# Patient Record
Sex: MALE | Race: BLACK OR AFRICAN AMERICAN | NOT HISPANIC OR LATINO | URBAN - METROPOLITAN AREA
[De-identification: names, ages, dates, MRNs, and addresses within clinical notes are randomized per-mention and may not be internally consistent; named-entity substitution may affect disease eponyms.]

---
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
## 2025-01-14 ENCOUNTER — HOSPITAL ENCOUNTER (INPATIENT)
Age: 20
LOS: 3 days | Discharge: HOME | DRG: 885 | End: 2025-01-17
Attending: STUDENT IN AN ORGANIZED HEALTH CARE EDUCATION/TRAINING PROGRAM | Admitting: PSYCHIATRY & NEUROLOGY
Payer: MEDICAID

## 2025-01-14 DIAGNOSIS — R45.851 SUICIDAL IDEATION: ICD-10-CM

## 2025-01-14 DIAGNOSIS — F32.A DEPRESSION, UNSPECIFIED DEPRESSION TYPE: Primary | ICD-10-CM

## 2025-01-14 LAB
ALBUMIN SERPL-MCNC: 4.6 G/DL (ref 3.5–4.6)
ALP SERPL-CCNC: 69 U/L (ref 35–104)
ALT SERPL-CCNC: 10 U/L (ref 0–41)
AMPHET UR QL SCN: ABNORMAL
ANION GAP SERPL CALCULATED.3IONS-SCNC: 10 MEQ/L (ref 9–15)
APAP SERPL-MCNC: <5 UG/ML (ref 10–30)
AST SERPL-CCNC: 14 U/L (ref 0–40)
BARBITURATES UR QL SCN: ABNORMAL
BASOPHILS # BLD: 0 K/UL (ref 0–0.2)
BASOPHILS NFR BLD: 0.5 %
BENZODIAZ UR QL SCN: ABNORMAL
BILIRUB SERPL-MCNC: 0.7 MG/DL (ref 0.2–0.7)
BILIRUB UR QL STRIP: NEGATIVE
BUN SERPL-MCNC: 12 MG/DL (ref 6–20)
CALCIUM SERPL-MCNC: 9.1 MG/DL (ref 8.5–9.9)
CANNABINOIDS UR QL SCN: POSITIVE
CHLORIDE SERPL-SCNC: 104 MEQ/L (ref 95–107)
CHOLEST SERPL-MCNC: 135 MG/DL (ref 0–199)
CK SERPL-CCNC: 105 U/L (ref 0–190)
CLARITY UR: CLEAR
CO2 SERPL-SCNC: 26 MEQ/L (ref 20–31)
COCAINE UR QL SCN: ABNORMAL
COLOR UR: YELLOW
CREAT SERPL-MCNC: 0.98 MG/DL (ref 0.7–1.2)
DRUG SCREEN COMMENT UR-IMP: ABNORMAL
EOSINOPHIL # BLD: 0.2 K/UL (ref 0–0.7)
EOSINOPHIL NFR BLD: 2.5 %
ERYTHROCYTE [DISTWIDTH] IN BLOOD BY AUTOMATED COUNT: 13.6 % (ref 11.5–14.5)
ESTIMATED AVERAGE GLUCOSE: 105 MG/DL
ETHANOL PERCENT: NORMAL G/DL
ETHANOLAMINE SERPL-MCNC: <10 MG/DL (ref 0–0.08)
FENTANYL SCREEN, URINE: ABNORMAL
GLOBULIN SER CALC-MCNC: 3.1 G/DL (ref 2.3–3.5)
GLUCOSE SERPL-MCNC: 108 MG/DL (ref 70–99)
GLUCOSE UR STRIP-MCNC: NEGATIVE MG/DL
HBA1C MFR BLD: 5.3 % (ref 4–6)
HCT VFR BLD AUTO: 44.8 % (ref 42–52)
HDLC SERPL-MCNC: 42 MG/DL (ref 40–59)
HGB BLD-MCNC: 15.3 G/DL (ref 14–18)
HGB UR QL STRIP: NEGATIVE
KETONES UR STRIP-MCNC: >=80 MG/DL
LDLC SERPL CALC-MCNC: 82 MG/DL (ref 0–129)
LEUKOCYTE ESTERASE UR QL STRIP: NEGATIVE
LYMPHOCYTES # BLD: 2.6 K/UL (ref 1–4.8)
LYMPHOCYTES NFR BLD: 42.5 %
MCH RBC QN AUTO: 28 PG (ref 27–31.3)
MCHC RBC AUTO-ENTMCNC: 34.2 % (ref 33–37)
MCV RBC AUTO: 81.9 FL (ref 79–92.2)
METHADONE UR QL SCN: ABNORMAL
MONOCYTES # BLD: 0.4 K/UL (ref 0.2–0.8)
MONOCYTES NFR BLD: 6.9 %
NEUTROPHILS # BLD: 2.9 K/UL (ref 1.4–6.5)
NEUTS SEG NFR BLD: 47.4 %
NITRITE UR QL STRIP: NEGATIVE
OPIATES UR QL SCN: ABNORMAL
OXYCODONE UR QL SCN: ABNORMAL
PCP UR QL SCN: ABNORMAL
PH UR STRIP: 6 [PH] (ref 5–9)
PLATELET # BLD AUTO: 206 K/UL (ref 130–400)
POTASSIUM SERPL-SCNC: 4.2 MEQ/L (ref 3.4–4.9)
PROPOXYPH UR QL SCN: ABNORMAL
PROT SERPL-MCNC: 7.7 G/DL (ref 6.3–8)
PROT UR STRIP-MCNC: ABNORMAL MG/DL
RBC # BLD AUTO: 5.47 M/UL (ref 4.7–6.1)
SALICYLATES SERPL-MCNC: <0.3 MG/DL (ref 15–30)
SODIUM SERPL-SCNC: 140 MEQ/L (ref 135–144)
SP GR UR STRIP: 1.04 (ref 1–1.03)
TRIGL SERPL-MCNC: 55 MG/DL (ref 0–150)
TSH SERPL-MCNC: 1.01 UIU/ML (ref 0.44–3.86)
URINE REFLEX TO CULTURE: ABNORMAL
UROBILINOGEN UR STRIP-ACNC: 1 E.U./DL
WBC # BLD AUTO: 6.1 K/UL (ref 4.5–11)

## 2025-01-14 PROCEDURE — 83036 HEMOGLOBIN GLYCOSYLATED A1C: CPT

## 2025-01-14 PROCEDURE — 82077 ASSAY SPEC XCP UR&BREATH IA: CPT

## 2025-01-14 PROCEDURE — 85025 COMPLETE CBC W/AUTO DIFF WBC: CPT

## 2025-01-14 PROCEDURE — 80053 COMPREHEN METABOLIC PANEL: CPT

## 2025-01-14 PROCEDURE — 80143 DRUG ASSAY ACETAMINOPHEN: CPT

## 2025-01-14 PROCEDURE — 99285 EMERGENCY DEPT VISIT HI MDM: CPT

## 2025-01-14 PROCEDURE — 82550 ASSAY OF CK (CPK): CPT

## 2025-01-14 PROCEDURE — 1240000000 HC EMOTIONAL WELLNESS R&B

## 2025-01-14 PROCEDURE — 6370000000 HC RX 637 (ALT 250 FOR IP): Performed by: PSYCHIATRY & NEUROLOGY

## 2025-01-14 PROCEDURE — 36415 COLL VENOUS BLD VENIPUNCTURE: CPT

## 2025-01-14 PROCEDURE — 80061 LIPID PANEL: CPT

## 2025-01-14 PROCEDURE — 81003 URINALYSIS AUTO W/O SCOPE: CPT

## 2025-01-14 PROCEDURE — 80307 DRUG TEST PRSMV CHEM ANLYZR: CPT

## 2025-01-14 PROCEDURE — 84443 ASSAY THYROID STIM HORMONE: CPT

## 2025-01-14 PROCEDURE — 80179 DRUG ASSAY SALICYLATE: CPT

## 2025-01-14 RX ORDER — ACETAMINOPHEN 325 MG/1
650 TABLET ORAL EVERY 4 HOURS PRN
Status: DISCONTINUED | OUTPATIENT
Start: 2025-01-14 | End: 2025-01-17 | Stop reason: HOSPADM

## 2025-01-14 RX ORDER — HYDROXYZINE HYDROCHLORIDE 50 MG/ML
50 INJECTION, SOLUTION INTRAMUSCULAR EVERY 6 HOURS PRN
Status: DISCONTINUED | OUTPATIENT
Start: 2025-01-14 | End: 2025-01-17 | Stop reason: HOSPADM

## 2025-01-14 RX ORDER — TRAZODONE HYDROCHLORIDE 50 MG/1
50 TABLET, FILM COATED ORAL NIGHTLY PRN
Status: DISCONTINUED | OUTPATIENT
Start: 2025-01-14 | End: 2025-01-17 | Stop reason: HOSPADM

## 2025-01-14 RX ORDER — HALOPERIDOL 5 MG/ML
5 INJECTION INTRAMUSCULAR EVERY 6 HOURS PRN
Status: DISCONTINUED | OUTPATIENT
Start: 2025-01-14 | End: 2025-01-17 | Stop reason: HOSPADM

## 2025-01-14 RX ORDER — HALOPERIDOL 5 MG/1
5 TABLET ORAL EVERY 6 HOURS PRN
Status: DISCONTINUED | OUTPATIENT
Start: 2025-01-14 | End: 2025-01-17 | Stop reason: HOSPADM

## 2025-01-14 RX ORDER — BENZTROPINE MESYLATE 1 MG/ML
2 INJECTION, SOLUTION INTRAMUSCULAR; INTRAVENOUS 2 TIMES DAILY PRN
Status: DISCONTINUED | OUTPATIENT
Start: 2025-01-14 | End: 2025-01-17 | Stop reason: HOSPADM

## 2025-01-14 RX ORDER — HYDROXYZINE PAMOATE 50 MG/1
50 CAPSULE ORAL EVERY 6 HOURS PRN
Status: DISCONTINUED | OUTPATIENT
Start: 2025-01-14 | End: 2025-01-17 | Stop reason: HOSPADM

## 2025-01-14 RX ORDER — MAGNESIUM HYDROXIDE/ALUMINUM HYDROXICE/SIMETHICONE 120; 1200; 1200 MG/30ML; MG/30ML; MG/30ML
30 SUSPENSION ORAL PRN
Status: DISCONTINUED | OUTPATIENT
Start: 2025-01-14 | End: 2025-01-17 | Stop reason: HOSPADM

## 2025-01-14 RX ADMIN — TRAZODONE HYDROCHLORIDE 50 MG: 50 TABLET, FILM COATED ORAL at 21:06

## 2025-01-14 RX ADMIN — HYDROXYZINE PAMOATE 50 MG: 50 CAPSULE ORAL at 21:06

## 2025-01-14 ASSESSMENT — PATIENT HEALTH QUESTIONNAIRE - PHQ9
SUM OF ALL RESPONSES TO PHQ QUESTIONS 1-9: 1
SUM OF ALL RESPONSES TO PHQ9 QUESTIONS 1 & 2: 1
SUM OF ALL RESPONSES TO PHQ QUESTIONS 1-9: 1
1. LITTLE INTEREST OR PLEASURE IN DOING THINGS: NOT AT ALL
2. FEELING DOWN, DEPRESSED OR HOPELESS: SEVERAL DAYS
SUM OF ALL RESPONSES TO PHQ QUESTIONS 1-9: 1
SUM OF ALL RESPONSES TO PHQ QUESTIONS 1-9: 1

## 2025-01-14 ASSESSMENT — SLEEP AND FATIGUE QUESTIONNAIRES
DO YOU HAVE DIFFICULTY SLEEPING: NO
SLEEP PATTERN: NORMAL
DO YOU USE A SLEEP AID: YES
AVERAGE NUMBER OF SLEEP HOURS: 7

## 2025-01-14 ASSESSMENT — LIFESTYLE VARIABLES
HOW OFTEN DO YOU HAVE A DRINK CONTAINING ALCOHOL: 2-4 TIMES A MONTH
HOW MANY STANDARD DRINKS CONTAINING ALCOHOL DO YOU HAVE ON A TYPICAL DAY: 3 OR 4

## 2025-01-14 ASSESSMENT — ENCOUNTER SYMPTOMS
NAUSEA: 0
CHEST TIGHTNESS: 0
ABDOMINAL DISTENTION: 0
EYE REDNESS: 0
COLOR CHANGE: 0
SINUS PAIN: 0
COUGH: 0
DIARRHEA: 0
SHORTNESS OF BREATH: 0
EYE PAIN: 0
RHINORRHEA: 0
EYE DISCHARGE: 0
ABDOMINAL PAIN: 0
EYE ITCHING: 0
VOMITING: 0

## 2025-01-14 ASSESSMENT — PAIN - FUNCTIONAL ASSESSMENT: PAIN_FUNCTIONAL_ASSESSMENT: NONE - DENIES PAIN

## 2025-01-14 NOTE — ED NOTES
Patient to [facility], changed into psych safe clothing. Skin intact. Belongings secured and placed into locker 4. No contraband found at this time. Urine specimen provided and sent to lab. Lab notified of new orders.

## 2025-01-14 NOTE — ED NOTES
Provisional Diagnosis:   Depression    Psychosocial and Contextual Factors:     Born in [place]. Raised in [place] and [place]. Moved to [place] about 2 years ago  Dropped out of school in the 12th grade  Lives with girlfriend of 2 years and 9 month old daughter   No insurance     C-SSRS Summary:  C-SSRS Suicide Screening1) Within the past month, have you wished you were dead or wished you could go to sleep and not wake up? : Yes2) Have you actually had any thoughts of killing yourself? : Yes3) Have you been thinking about how you might kill yourself? : Yes4) Have you had these thoughts and had some intention of acting on them? : No5) Have you started to work out or worked out the details of how to kill yourself? Do you intend to carry out this plan? : No6) Have you ever done anything, started to do anything, or prepared to do anything to end your life?: YesDid this occur within the past 3 months? : YesRisk of Suicide: High Risk  C-SSRS Risk AssessmentSuicidal and Self-Injurious Behavior : Other preparatory acts to kill self - Past 3 monthsSuicidal Ideation (Most Severe in Past Month): Wish to be dead; Suicidal thoughts; Suicidal thoughts with method (but without specific plan or intent to act)Activating Events (Recent): Other (comment)Treatment History: Not receiving treatmentClinical Status (Recent): Hopelessness; Major depression episode    Patient: guarded, poor eye contact, quiet, pleasant   Family: none  Agency: none    Substance Abuse: Reports daily use of marijuana     Present Suicidal Behavior:      Verbal: Has suicidal thoughts worsening, just wants to \"end it all\" Has thought about shooting self with gun and does have access to gun     Attempt: Denies. Had unloaded gun in hand and had thoughts of loading gun but did not     Past Suicidal Behavior:     Verbal:reports suicidal thoughts on and off for several months but reports worsening over the past few weeks     Attempt:Denies

## 2025-01-14 NOTE — ED PROVIDER NOTES
[facility] EMERGENCY DEPARTMENT  EMERGENCY DEPARTMENT ENCOUNTER      Pt Name: Ruben Watson  MRN: [id number]  Birthdate 2005  Date of evaluation: 1/14/2025  Provider: Jacob Warren MD  3:07 PM    CHIEF COMPLAINT       Chief Complaint   Patient presents with    Suicidal     SUICIDAL          HISTORY OF PRESENT ILLNESS    Ruben Watson is a 19 y.o. male who presents to the emergency department for suicidal ideations     HPI  Patient is a 19-year-old male presenting to the ED due to suicidal ideations.  Patient has no psychiatric history.  Patient endorsed to me that for 3 months has been having on and off suicidal thoughts.  During this time.  He is also had increased depression.  He endorses decreased sleep, appetite, interest and concentration and activities.  He endorses having some guilt as well.  He was vague on the guilt although described that he feels guilty for \"his actions and in-actions in relation to his girlfriend\".  He denies any physical altercations with his girlfriend.  He lives with his girlfriend and his child.  He endorsed that 2-3 days ago he almost attempted suicide.  He endorsed that he had one of his pistols in his hand which had no ammunition in it.  He was holding it in his hand but never pointed it at himself.  He endorsed that he was having thoughts of putting ammunition inside the pistol and shooting himself.  He denies any other plans for suicide including not taking any medications in excess.  He denies any homicidal ideations or plans.  He denies any previous suicide attempts or inpatient psychiatric admissions.  He denies being on any psychiatric medications.  He went to the [facility] today after his sister, who goes to the [facility], told him about it to get some help.  He denies any visual or auditory or tactile hallucinations.  He endorses social alcohol consumption, on and off marijuana usage, he does not smoke cigarettes.  He denies any cocaine or methamphetamine or

## 2025-01-14 NOTE — PROGRESS NOTES
1501 Received report from FESTUS Muñoz.  1510 Pt admitted to unit via wheelchair. Pt ambulated with strong steady gait to room. Dual skin check and contraband check done with FESTUS Shahid. Both negative.

## 2025-01-14 NOTE — CONSULTS
Consult Note            Date:1/14/2025        Patient Name:Ruben Watson     YOB: 2005     Age:19 y.o.    Reason for Consult: Evaluation recommendation for chronic disease management    Chief Complaint     Chief Complaint   Patient presents with    Suicidal     SUICIDAL           History Obtained From   patient, electronic medical record    History of Present Illness   Patient is a 19-year-old male admitted for mental health evaluation for thoughts of suicide ideation.  Patient reports inability to sleep denies suicide ideation at this time.  Calm and cooperative with assessment.  Patient denies chest pain, palpitations, lightheadedness, headache, dizziness, shortness of breath, cough, N/V/D, and changes in appetite.  Patient also denies smoking, illicit drug, and alcohol use.  Denies self-inflicted injuries or wounds.   Hospitalist consulted for evaluation and recommendations for acute and chronic disease management while receiving inpatient psych services.        Past Medical History   History reviewed. No pertinent past medical history.     Past Surgical History   No past surgical history on file.     Medications     Prior to Admission medications    Not on File        hydrOXYzine pamoate (VISTARIL) capsule 50 mg, Q6H PRN   Or  hydrOXYzine (VISTARIL) injection 50 mg, Q6H PRN  acetaminophen (TYLENOL) tablet 650 mg, Q4H PRN  magnesium hydroxide (MILK OF MAGNESIA) 400 MG/5ML suspension 30 mL, Daily PRN  aluminum & magnesium hydroxide-simethicone (MAALOX) 200-200-20 MG/5ML suspension 30 mL, PRN  haloperidol (HALDOL) tablet 5 mg, Q6H PRN   Or  haloperidol lactate (HALDOL) injection 5 mg, Q6H PRN  benztropine mesylate (COGENTIN) injection 2 mg, BID PRN  traZODone (DESYREL) tablet 50 mg, Nightly PRN        Allergies   Patient has no known allergies.    Social History     Social History       Tobacco History       Smoking Status  Never Assessed      Smokeless Tobacco Use  Unknown              Alcohol

## 2025-01-15 PROCEDURE — 6370000000 HC RX 637 (ALT 250 FOR IP): Performed by: PSYCHIATRY & NEUROLOGY

## 2025-01-15 PROCEDURE — 99223 1ST HOSP IP/OBS HIGH 75: CPT | Performed by: PSYCHIATRY & NEUROLOGY

## 2025-01-15 PROCEDURE — 1240000000 HC EMOTIONAL WELLNESS R&B

## 2025-01-15 RX ORDER — SERTRALINE HYDROCHLORIDE 25 MG/1
25 TABLET, FILM COATED ORAL DAILY
Status: DISCONTINUED | OUTPATIENT
Start: 2025-01-15 | End: 2025-01-16

## 2025-01-15 RX ADMIN — TRAZODONE HYDROCHLORIDE 50 MG: 50 TABLET, FILM COATED ORAL at 21:15

## 2025-01-15 RX ADMIN — SERTRALINE HYDROCHLORIDE 25 MG: 25 TABLET ORAL at 14:00

## 2025-01-15 ASSESSMENT — PATIENT HEALTH QUESTIONNAIRE - PHQ9
2. FEELING DOWN, DEPRESSED OR HOPELESS: NOT AT ALL
SUM OF ALL RESPONSES TO PHQ QUESTIONS 1-9: 0
1. LITTLE INTEREST OR PLEASURE IN DOING THINGS: NOT AT ALL
SUM OF ALL RESPONSES TO PHQ9 QUESTIONS 1 & 2: 0
SUM OF ALL RESPONSES TO PHQ QUESTIONS 1-9: 0

## 2025-01-15 ASSESSMENT — LIFESTYLE VARIABLES
HOW MANY STANDARD DRINKS CONTAINING ALCOHOL DO YOU HAVE ON A TYPICAL DAY: PATIENT DOES NOT DRINK
HOW OFTEN DO YOU HAVE A DRINK CONTAINING ALCOHOL: NEVER

## 2025-01-15 NOTE — GROUP NOTE
Date: 1/15/2025    Group Start Time: 1020  Group End Time: 1055  Group Topic: Psychoeducation    [facility] [place] [facility] Odalys Orta    Stages of Change through a Musical Lens  Clarifying, Education, Exploration, Recorded Music Listening, Song Categorization    Patients will learn about the 5 Stages of Change (pre-contemplation, contemplation, preparation, action, and maintenance), and explore what stage of change they feel like they are in currently. Patients will be asked to listen to a variety of songs and \"diagnose\" them into a specific stage of change. Patients and LPMT will discuss why songs might fit into one/multiple stage(s).    Focus: Breaking Negative Cycles, Motivation, Recovery    Goals: Increase Future-Oriented Thinking, Improve Coping Skills, Improve Insight/Self-Awareness, Improve Self-Expression, Improve Attention to Task     Patient listened to/engaged in peer discussions about the stages of change. Patient provided relevant comments as to what stage he felt was represented in the music. Patient verbalized support for peers' contributions.     Attended: 3/4-full attendance  Participation Level: Active Listener  Participation Quality: Sharing  Affect/Mood: Congruent/Euthymic  Speech: normal rate and normal volume   Thought Content/Processes: Linear  Level of consciousness: Alert  Response: Able to verbalize current knowledge/experience  Outcomes: Actively participated in the group experience    Signature: Odalys Purdy, Licensed Professional Music Therapist (LPMT)

## 2025-01-15 NOTE — CARE COORDINATION
Psychosocial Assessment     Admission Reason: Client has been admitted to [facility] after being pink slipped by Gita for SI with a plan. Client's life stressors are financial     C-SSRS Lifetime Recent Completed - Current Suicide Risk:   [] No Risk  [] Low [x] Moderate [] High     Risk Factors:   Unemployed,     Protective Factors: Supportive family, no lethal means, reports is not suicidal currently       Gender:  [x] Male [] Female [] Transgender  [] Other    Sexual Orientation:  [x] Heterosexual [] Homosexual [] Bisexual [] Other    Current or Past Mental Health and/or Addictions Treatment (and response to treatment):  [] Yes, When and Where:   [x] No    Substance Use/Alcohol Use/Addiction (document name of substance, age of onset, how much and how often, route of use and date of last use):  [x] Reports   Substance: Marijuana   Age of Onset:  \"Teenage\"   How Much and how often: \"A joint every now and then.\"   Last Usage: week of admission     [] Denies    AUDIT: 0   DAST: 1  PHQ 9: 0    Education provided:  [x] Yes  [] No  [] N/A [] Refused    Learners: Patient [x]  Family []  Significant Other  [] Caregiver [] Other []   Readiness: Eager []   Acceptance  []   Nonacceptances [x]   Refused []   Method: Explanation [x]   Handout []   Response: Verbalizes Understanding []   No evidence of Learning []   Refuses [x]     Referral made to Let's get Real  [] Yes       Date:              [x] No    Family History of Mental Illness or Substance Use/Abuse:   [] Yes (Specify)    [x] No    Trauma and Abuse History:   [] Reports   ( Physical []  Verbal []  Emotional []  Financial []   Sexual [] )  [x] Denies      Legal History:  []  Yes (Specify)    [x] No     Involvement:  [] Yes (Specify)    [x] No     Employment and/or Benefits:    [] Yes (Specify)   SSD []  SSI []   SNAP[] Medicaid[]  [x] No      Leisure & Recreational Interests and Hobbies/Coping Skills:  Reports walks in nature, being with family and music

## 2025-01-15 NOTE — GROUP NOTE
Group Therapy Note    Date: 1/14/2025    Group Start Time: 2050  Group End Time: 2145  Group Topic: Wrap-Up    [facility] Jessica Esquivel        Group Therapy Note    Attendees: 7/19       Notes:  Pt did not attend.     Discipline Responsible: Behavorial Health Tech      Signature:  Jessica Salazar

## 2025-01-15 NOTE — GROUP NOTE
Date: 1/15/2025    Group Start Time: 0925  Group End Time: 0958  Group Topic: Music Therapy    [facility] Odalys Orta    Change Identification  Exploration, [place] Analysis/Discussion, Receptive Music Listening    Patients will listen to \"It's Not the Same Anymore\" by Sreekanth Heck and discuss lyrics, themes, and interpretations derived from the song. Patients will explore what change means to them and identify what change(s) they would like to make in their lives.     Focus: Change, Processing, Recovery    Goals: Increase Future-Oriented Thinking, Improve Coping Skills, Improve Insight/Self-Awareness, Improve Self-Expression, Improve Attention to Task     Patient joined towards the end of group, but was an active participant while present. Patient shared that his daughter brings flor to his life and inspires him to change. Patient expressed that he wants to learn how to reach out for help / open up more.     Attended: 1/4-1/2 attendance  Participation Level: Active Listener and Interactive  Participation Quality: Attentive and Sharing  Affect/Mood: Congruent/Euthymic  Speech: spontaneous, normal rate, and normal volume   Thought Content/Processes: Linear  Level of consciousness: Alert  Response: Able to verbalize current knowledge/experience  Outcomes: Actively participated in the group experience and Initial interaction with patient    Signature: Odalys Purdy, Licensed Professional Music Therapist (LPMT)

## 2025-01-15 NOTE — CARE COORDINATION
Leisure Assessment  January 15, 2025 /  1000 am    Appearance: Alert, Appears as stated age, and Pleasant  Current Mental Status: Calm and Cooperative  Affect/Mood: Incongruent/ Euthymic  Thought Content/Processes: Vague  Insight/Judgement: Poor insight and Poor judgment  Speech: spontaneous, normal rate, and normal volume  Delusions/Hallucinations: none observed/reported    Admit Status:  Involuntary     Patient identified his leisure interests were spending time with his daughter and writing music. Patient reported that he prefers to spend his time with others, and that his girlfriend is very supportive. Patient expressed that he has been opening up more over the past few months and has found it beneficial. Patient described the circumstances leading to admission were \"I had depression so I said the right things I needed to get admitted.\" Patient would like to focus on connecting with OP counseling services and medication management. Patient identified his strength was being \"funny.\"    Recommendations: Decrease Impulsivity/Impulsive Behaviors, Improve/Maintain Coping Skills Development, Improve/Maintain Emotion Regulation Skills/Mood, Improve/Maintain Expressive Communication/Self-Expression, and Improve/Maintain Insight/Self-Awareness    Signature: Odalys Purdy, Licensed Professional Music Therapist (LPMT)

## 2025-01-15 NOTE — PLAN OF CARE
Patient is out in the day room playing cards. Flat affect. Pt reports feeling better after talking with staff today. Minimizes admission. Denies both anxiety and depression. Denies SI/HI and AVH. Reports good sleep and appetite. Denies further needs at this time   Problem: Self Harm/Suicidality  Goal: Will have no self-injury during hospital stay  Description: INTERVENTIONS:  1.  Ensure constant observer at bedside with Q15M safety checks  2.  Maintain a safe environment  3.  Secure patient belongings  4.  Ensure family/visitors adhere to safety recommendations  5.  Ensure safety tray has been added to patient's diet order  6.  Every shift and PRN: Re-assess suicidal risk via Frequent Screener    1/14/2025 2014 by Sharri Justice RN  Outcome: Progressing  1/14/2025 1611 by Fátima Banegas RN  Outcome: Progressing     Problem: Anxiety  Goal: Will report anxiety at manageable levels  Description: INTERVENTIONS:  1. Administer medication as ordered  2. Teach and rehearse alternative coping skills  3. Provide emotional support with 1:1 interaction with staff  1/14/2025 2014 by Sharri Justice RN  Outcome: Progressing  1/14/2025 1611 by Fátima Banegas RN  Outcome: Progressing     Problem: Coping  Goal: Pt/Family able to verbalize concerns and demonstrate effective coping strategies  Description: INTERVENTIONS:  1. Assist patient/family to identify coping skills, available support systems and cultural and spiritual values  2. Provide emotional support, including active listening and acknowledgement of concerns of patient and caregivers  3. Reduce environmental stimuli, as able  4. Instruct patient/family in relaxation techniques, as appropriate  5. Assess for spiritual pain/suffering and initiate Spiritual Care, Psychosocial Clinical Specialist consults as needed  1/14/2025 2014 by Sharri Justice RN  Outcome: Progressing  1/14/2025 1611 by Fátima Banegas RN  Outcome: Progressing     Problem: Depression/Self Harm  Goal:

## 2025-01-15 NOTE — PLAN OF CARE
Pt is calm, cooperative, and pleasant.  Denies all.  Responds politely with answering questions saying yes phi and no phi.  No depression \"since I've been talking to people in here.  I'm not alone.\" Pt has good eye contact and responds appropriately.  1/10 anxiety.  Reports good sleep and appetite.         Problem: Self Harm/Suicidality  Goal: Will have no self-injury during hospital stay  Description: INTERVENTIONS:  1.  Ensure constant observer at bedside with Q15M safety checks  2.  Maintain a safe environment  3.  Secure patient belongings  4.  Ensure family/visitors adhere to safety recommendations  5.  Ensure safety tray has been added to patient's diet order  6.  Every shift and PRN: Re-assess suicidal risk via Frequent Screener    1/15/2025 0808 by Kayleigh Stoll, RN  Outcome: Progressing  Flowsheets (Taken 1/15/2025 0802)  Will have no self-injury during hospital stay: Maintain a safe environment  1/14/2025 2014 by Sharri Justice RN  Outcome: Progressing     Problem: Anxiety  Goal: Will report anxiety at manageable levels  Description: INTERVENTIONS:  1. Administer medication as ordered  2. Teach and rehearse alternative coping skills  3. Provide emotional support with 1:1 interaction with staff  1/15/2025 0808 by Kayleigh Stoll RN  Outcome: Progressing  Flowsheets (Taken 1/15/2025 0802)  Will report anxiety at manageable levels:   Administer medication as ordered   Provide emotional support with 1:1 interaction with staff   Teach and rehearse alternative coping skills  1/14/2025 2014 by Sharri Justice RN  Outcome: Progressing     Problem: Coping  Goal: Pt/Family able to verbalize concerns and demonstrate effective coping strategies  Description: INTERVENTIONS:  1. Assist patient/family to identify coping skills, available support systems and cultural and spiritual values  2. Provide emotional support, including active listening and acknowledgement of concerns of patient and caregivers  3.  by Sharri Justice, RN  Outcome: Progressing  Flowsheets (Taken 1/14/2025 2014)  Effect of psychiatric condition will be minimized and patient will be protected from self harm: Provide emotional support, presence and reassurance     Problem: Involuntary Admit  Goal: Will cooperate with staff recommendations and doctor's orders and will demonstrate appropriate behavior  Description: INTERVENTIONS:  1. Treat underlying conditions and offer medication as ordered  2. Educate regarding involuntary admission procedures and rules  3. Contain excessive/inappropriate behavior per unit and hospital policies  1/15/2025 0808 by Kayleigh Stoll, RN  Outcome: Progressing  Flowsheets (Taken 1/15/2025 0802)  Will cooperate with staff recommendations and doctor's orders and will demonstrate appropriate behavior: Educate regarding involuntary admission procedures and rules  1/14/2025 2014 by Sharri Justice, RN  Outcome: Progressing     Problem: Discharge Planning  Goal: Discharge to home or other facility with appropriate resources  1/15/2025 0808 by Kayleigh Stoll, RN  Outcome: Progressing  Flowsheets (Taken 1/15/2025 0802)  Discharge to home or other facility with appropriate resources:   Identify barriers to discharge with patient and caregiver   Arrange for needed discharge resources and transportation as appropriate  1/14/2025 2014 by Sharri Justice, RN  Outcome: Progressing

## 2025-01-15 NOTE — H&P
[facility] - Department of Psychiatry    History and Physical - Adult         CHIEF COMPLAINT:  Depression SI    History obtained from:  patient    Patient was seen after discussing with the treatment team and reviewing the chart        CIRCUMSTANCES OF ADMISSION:     Patient presented to the ED at the request of Gita for suicidal ideation. He voluntarily came. Patient is guarded, flat affect with poor eye contact. He reports being depressed for a while now but suicidal thoughts are worsening. He wants to just \"end it all\" and has thoughts of using a gun to shoot self. A few days ago he had an unloaded gun in his hand but never pointed it at himself, he was thinking about loading it and shooting himself but did not load it. He has no family support around. Both his Dad and step-mom passed away when he was 15 years old. He found his Dad  on the floor. Has never sought treatment or therapy from this. He has a sister but she lives in [place]. Recently he has been overwhelmed and stressed out financially. He is not working and has no source of income. States he was let go from his job about a month and a half ago and currently seeking employment. Denies any HI. Denies any hallucinations.      HISTORY OF PRESENT ILLNESS:      The patient is a 19 y.o. male live with GF and daughter 9 month old unemployed with no significant past history of mental illness    Pt has been feeling depressed for the past few weeks, getting worse  Stressors: financial stressor, getting along with the relationship  Pt is currently minimizing the circumstances of admission  Pt want someone to talk to and he lied about it to come over here  I don't have a gun or ammunition  Severity: Rating mood to be around 3-4/10 (10- good)  Quality:melancholic  Content: Hopeless, worthless and helpless feeling  Suicidal thoughts - minimizing  Associated symptoms:  Poor concentration, anhedonia, decrease motivation  Sleep and  Information:  Will obtain collateral information from the family or friends.  Will obtain medical records as appropriate from out patient providers  Will consult the hospitalist for a physical exam to rule out any co-morbid physical condition.    Home medication Reconciled       New Medications started during this admission :    See orders  Prn Haldol 5mg and Vistaril 50mg q6hr for extreme agitation.  Trazodone as ordered for insomnia  Vistaril as ordered for anxiety  Discussed with the patient risk, benefit, alternative and common side effects for the  proposed medication treatment. Patient is consenting to the treatment.    Psychotherapy:   Encourage participation in milieu and group therapy  Individual therapy as needed      Electronically signed by GABBIE ARAMBULA MD on 1/15/2025 at 9:41 AM

## 2025-01-15 NOTE — PROGRESS NOTES
Behavioral Services  Medicare Certification Upon Admission    I certify that this patient's inpatient psychiatric hospital admission is medically necessary for:    [x] (1) Treatment which could reasonably be expected to improve this patient's condition,       [x] (2) Or for diagnostic study;     AND     [x](2) The inpatient psychiatric services are provided while the individual is under the care of a physician and are included in the individualized plan of care.    Estimated length of stay/service 3-5    Plan for post-hospital care OP care    Electronically signed by GABBIE ARAMBULA MD on 1/15/2025 at 9:40 AM

## 2025-01-16 PROCEDURE — 99232 SBSQ HOSP IP/OBS MODERATE 35: CPT | Performed by: PSYCHIATRY & NEUROLOGY

## 2025-01-16 PROCEDURE — 90833 PSYTX W PT W E/M 30 MIN: CPT | Performed by: PSYCHIATRY & NEUROLOGY

## 2025-01-16 PROCEDURE — 6370000000 HC RX 637 (ALT 250 FOR IP): Performed by: PSYCHIATRY & NEUROLOGY

## 2025-01-16 PROCEDURE — 1240000000 HC EMOTIONAL WELLNESS R&B

## 2025-01-16 RX ADMIN — SERTRALINE HYDROCHLORIDE 25 MG: 25 TABLET ORAL at 08:50

## 2025-01-16 RX ADMIN — TRAZODONE HYDROCHLORIDE 50 MG: 50 TABLET, FILM COATED ORAL at 21:55

## 2025-01-16 NOTE — GROUP NOTE
Group Therapy Note    Date: 1/16/2025    Group Start Time: 0900  Group End Time: 1000  Group Topic: Psychoeducation    [facility] Phyllis Simon    Group Therapy Note    Note Card Discussion    Patients will be told to choose one or more of the note cards provided to them, and will read the question out loud to the other peers/staff. Patient will then answer the question, and all other peers will answer the same question. Questions range from using coping skills, mood management, and personal preferences. Patients will be encouraged to discuss their answers with other peers/staff.     Goals: Reality orientation, positive coping skills, mood management skills, peer discussion, fair/good insight skills.             Pt was pleasant and sociable during group. Pt answered all questions and had a brightened facial affect when answering. Pt mentioned unsafe coping mechanisms AEB reporting that he \"smokes to cope with life\".     Attended: Full attendance  Participation Level: Interactive  Participation Quality: Appropriate and Attentive  Affect/Mood: Bright/Brightens and Calm  Speech: normal rate and whispered   Insight/Judgement: Fair insight and Fair judgment  Response: Able to verbalize/acknowledge new learning and Able to retain information    Signature: Phyllis Pineda, Psychoeducational Specialist

## 2025-01-16 NOTE — CARE COORDINATION
FAMILY COLLATERAL NOTE    Family/Support Name:  Ivett   Contact #: [phone]   Relationship to Pt::  Girlfriend        Family/Support contact aware of hospitalization: Yes   Presenting Symptoms/Current Concerns:  Increased life stress due to limited finances, unresolved childhood trauma and grief     Top 3 Life Stressors:   Lack of familial support (family ) remainder of family is out of state.   Finical strain.       Background History Relevant to Current Hospitalization:  Reports increased stressors, depressed mood, and not being himself with more reliance on marijuana         Family Mental Health/Substance Use History:   Unknown       Support Network's Goal for Hospitalization:  Yes     Discharge Plan: D/C home and F/U [facility] as a new client       Support Network Supportive of Discharge Plan:  Yes       Support can confirm Safety of Location and Security of Weapons:  No weapons in home       Support agreeable to Safeguard and Monitor Medications (including Prescription and OTC):  If Needed     Identified Barriers to Compliance with Discharge Plan:  Transportation will be an issue from 7 am to 5 pm     Recommendations for Support Network:         ELLIOTT Sanchez

## 2025-01-16 NOTE — PLAN OF CARE
Patient denies SI/HI/AVH.  Sleep is good.  Rates anxiety and depression as 0.  Reports coming to [facility] was a good move on his part because he needed help.  Glad to have met up with others who have the same sorts of problems as he does.  \"It feels really good to know others are feeling the same way I do.\"  \"I walked around and did laps and was able to talk.\"              Problem: Self Harm/Suicidality  Goal: Will have no self-injury during hospital stay  Description: INTERVENTIONS:  1.  Ensure constant observer at bedside with Q15M safety checks  2.  Maintain a safe environment  3.  Secure patient belongings  4.  Ensure family/visitors adhere to safety recommendations  5.  Ensure safety tray has been added to patient's diet order  6.  Every shift and PRN: Re-assess suicidal risk via Frequent Screener    1/16/2025 0748 by Laila Polanco RN  Outcome: Progressing  1/15/2025 2106 by Sharri Justice RN  Outcome: Progressing     Problem: Anxiety  Goal: Will report anxiety at manageable levels  Description: INTERVENTIONS:  1. Administer medication as ordered  2. Teach and rehearse alternative coping skills  3. Provide emotional support with 1:1 interaction with staff  1/16/2025 0748 by Laila Polanco RN  Outcome: Progressing  1/15/2025 2106 by Sharri Justice RN  Outcome: Progressing     Problem: Coping  Goal: Pt/Family able to verbalize concerns and demonstrate effective coping strategies  Description: INTERVENTIONS:  1. Assist patient/family to identify coping skills, available support systems and cultural and spiritual values  2. Provide emotional support, including active listening and acknowledgement of concerns of patient and caregivers  3. Reduce environmental stimuli, as able  4. Instruct patient/family in relaxation techniques, as appropriate  5. Assess for spiritual pain/suffering and initiate Spiritual Care, Psychosocial Clinical Specialist consults as needed  1/16/2025 0748 by Laila Polanco RN  Outcome:

## 2025-01-16 NOTE — GROUP NOTE
Group Therapy Note    Date: 1/16/2025    Group Start Time: 1720  Group End Time: 1810  Group Topic: Recreational    [facility] Jessica Esquivel    Patients engaged in a karaoke session for today's recreational group.   Group Therapy Note    Attendees: 13/19     Notes:  Pt attended today's group. Pt sang a song of his own that he has published on [employer] and [email or web address]. Pt expressed his enjoyment for today's group to this writter.     Discipline Responsible: Behavorial Health Tech      Signature:  Jessica Salazar

## 2025-01-16 NOTE — GROUP NOTE
Date: 1/16/2025    Group Start Time: 1025  Group End Time: 1105  Group Topic: Music Therapy    [facility] Odalys Orta    Montserrat Collage  Receptive Music Listening, Montserrat Analysis/Discussion, and Art Reflection    Patients will choose from a variety of preprepared song lyrics to use in a collage representing who they are. Patients will be encouraged to add color and additional words to their collage if they so choose. Patients will have the opportunity to share their collage with the group and reflect on the experience as a whole. Patients will listen to an LPMT-curated playlist comprised of the songs where the preprepared lyrics originate.    Focus: Creativity, Community Building, and Processing    Goals: Improve/Maintain Attention to Task, Improve/Maintain Identity Development, Improve/Maintain Insight / Self-Awareness, Increase/Maintain Level of Socialization, Improve Mood, Improve/Maintain Self-Esteem, Improve/Maintain Self-Expression, Improve/Maintain Use of Coping Skills, and Increase Sensory Stimulation     Patient appropriately utilized art materials to create his montserrat collage. Patient expressed that his collage represented how \"I was lost, but now I am finding my strength.\" Patient was cheerful and sociable with peers/staff.     Attended: 1/4-1/2 attendance  Participation Level: Active Listener and Interactive  Participation Quality: Attentive, Sharing, and Supportive  Affect/Mood: Congruent/Euthymic  Speech: spontaneous, normal rate, and normal volume   Thought Content/Processes: Linear  Level of consciousness: Alert  Response: Able to verbalize current knowledge/experience  Outcomes: Successfully internalized the purpose of intervention and Actively participated in the group experience    Signature: Odalys Purdy, Licensed Professional Music Therapist (LPMT)

## 2025-01-16 NOTE — PROGRESS NOTES
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 No pertinent past medical history.    FAMILY/SOCIAL HISTORY:  No family history on file.  Social History     Socioeconomic History    Marital status: Single     Spouse name: Not on file    Number of children: Not on file    Years of education: Not on file    Highest education level: Not on file   Occupational History    Not on file   Tobacco Use    Smoking status: Not on file    Smokeless tobacco: Not on file   Substance and Sexual Activity    Alcohol use: Not on file    Drug use: Not on file    Sexual activity: Not on file   Other Topics Concern    Not on file   Social History Narrative    Not on file     Social Determinants of Health     Financial Resource Strain: Not on file   Food Insecurity: No Food Insecurity (1/15/2025)    Hunger Vital Sign     Worried About Running Out of Food in the Last Year: Never true     Ran Out of Food in the Last Year: Never true   Transportation Needs: No Transportation Needs (1/15/2025)    PRAPARE - Transportation     Lack of Transportation (Medical): No     Lack of Transportation

## 2025-01-16 NOTE — FLOWSHEET NOTE
Pt has been visible out on the unit and is social with select peers.pt reports showering today and has been attending groups. Pt reports adequate sleep  and appetite. Pt states his mood is better and feels the medications are helping. Pt denies SI/HI/AVH.

## 2025-01-16 NOTE — GROUP NOTE
Group Therapy Note    Date: 1/16/2025    Group Start Time: 1300  Group End Time: 1335  Group Topic:  Group    [facility]    Jacky Mora LSW        Group Therapy Note    Attendees: 10       Patient's Goal:  Emotional Identification     Notes:  Client participated in group process of emotional identification with assistance of the emotion wheel. Client was able to identify feelings for today and cognitively understand why they feel this way. Client was willing to share with peers and was supportive of peers in the group process.     Status After Intervention:  Improved    Participation Level: Interactive    Participation Quality: Appropriate      Speech:  normal      Thought Process/Content: Logical      Affective Functioning: Congruent      Mood: euthymic      Level of consciousness:  Alert      Response to Learning: Able to verbalize current knowledge/experience      Endings: None Reported    Modes of Intervention: Education      Discipline Responsible: /Counselor      Signature:  ELLIOTT Sanchez

## 2025-01-16 NOTE — PLAN OF CARE
Patient is out playing cards. Cooperative with his assessment. Denies both anxiety and depression. Denies SI/HI and AVH. Requested/ received PRN trazodone. Reports good sleep and appetite. Denies further needs at this time   Problem: Self Harm/Suicidality  Goal: Will have no self-injury during hospital stay  Description: INTERVENTIONS:  1.  Ensure constant observer at bedside with Q15M safety checks  2.  Maintain a safe environment  3.  Secure patient belongings  4.  Ensure family/visitors adhere to safety recommendations  5.  Ensure safety tray has been added to patient's diet order  6.  Every shift and PRN: Re-assess suicidal risk via Frequent Screener    1/15/2025 2106 by Sharri Justice, RN  Outcome: Progressing  1/15/2025 0808 by Kayleigh Stoll, RN  Outcome: Progressing  Flowsheets (Taken 1/15/2025 0802)  Will have no self-injury during hospital stay: Maintain a safe environment     Problem: Anxiety  Goal: Will report anxiety at manageable levels  Description: INTERVENTIONS:  1. Administer medication as ordered  2. Teach and rehearse alternative coping skills  3. Provide emotional support with 1:1 interaction with staff  1/15/2025 2106 by Sharri Justice, RN  Outcome: Progressing  1/15/2025 0808 by Kayleigh Stoll, RN  Outcome: Progressing  Flowsheets (Taken 1/15/2025 0802)  Will report anxiety at manageable levels:   Administer medication as ordered   Provide emotional support with 1:1 interaction with staff   Teach and rehearse alternative coping skills     Problem: Coping  Goal: Pt/Family able to verbalize concerns and demonstrate effective coping strategies  Description: INTERVENTIONS:  1. Assist patient/family to identify coping skills, available support systems and cultural and spiritual values  2. Provide emotional support, including active listening and acknowledgement of concerns of patient and caregivers  3. Reduce environmental stimuli, as able  4. Instruct patient/family in relaxation

## 2025-01-17 VITALS
BODY MASS INDEX: 22.22 KG/M2 | SYSTOLIC BLOOD PRESSURE: 131 MMHG | HEART RATE: 55 BPM | RESPIRATION RATE: 18 BRPM | DIASTOLIC BLOOD PRESSURE: 72 MMHG | TEMPERATURE: 97.5 F | HEIGHT: 69 IN | WEIGHT: 150 LBS | OXYGEN SATURATION: 100 %

## 2025-01-17 PROBLEM — F32.2 CURRENT SEVERE EPISODE OF MAJOR DEPRESSIVE DISORDER WITHOUT PSYCHOTIC FEATURES WITHOUT PRIOR EPISODE (HCC): Status: ACTIVE | Noted: 2025-01-14

## 2025-01-17 PROCEDURE — 6370000000 HC RX 637 (ALT 250 FOR IP): Performed by: PSYCHIATRY & NEUROLOGY

## 2025-01-17 PROCEDURE — 99239 HOSP IP/OBS DSCHRG MGMT >30: CPT | Performed by: PSYCHIATRY & NEUROLOGY

## 2025-01-17 RX ORDER — TRAZODONE HYDROCHLORIDE 50 MG/1
50 TABLET, FILM COATED ORAL NIGHTLY PRN
Qty: 15 TABLET | Refills: 2 | Status: SHIPPED | OUTPATIENT
Start: 2025-01-17

## 2025-01-17 RX ADMIN — SERTRALINE HYDROCHLORIDE 50 MG: 50 TABLET ORAL at 08:20

## 2025-01-17 NOTE — PLAN OF CARE
Problem: Self Harm/Suicidality  Goal: Will have no self-injury during hospital stay  Description: INTERVENTIONS:  1.  Ensure constant observer at bedside with Q15M safety checks  2.  Maintain a safe environment  3.  Secure patient belongings  4.  Ensure family/visitors adhere to safety recommendations  5.  Ensure safety tray has been added to patient's diet order  6.  Every shift and PRN: Re-assess suicidal risk via Frequent Screener    Outcome: Progressing  Flowsheets (Taken 1/16/2025 1151 by Laila Polanco, FESTUS)  Will have no self-injury during hospital stay: Maintain a safe environment     Problem: Anxiety  Goal: Will report anxiety at manageable levels  Description: INTERVENTIONS:  1. Administer medication as ordered  2. Teach and rehearse alternative coping skills  3. Provide emotional support with 1:1 interaction with staff  Outcome: Progressing  Flowsheets (Taken 1/16/2025 1151 by Laila Polanco, RN)  Will report anxiety at manageable levels:   Provide emotional support with 1:1 interaction with staff   Administer medication as ordered     Problem: Coping  Goal: Pt/Family able to verbalize concerns and demonstrate effective coping strategies  Description: INTERVENTIONS:  1. Assist patient/family to identify coping skills, available support systems and cultural and spiritual values  2. Provide emotional support, including active listening and acknowledgement of concerns of patient and caregivers  3. Reduce environmental stimuli, as able  4. Instruct patient/family in relaxation techniques, as appropriate  5. Assess for spiritual pain/suffering and initiate Spiritual Care, Psychosocial Clinical Specialist consults as needed  Outcome: Progressing  Flowsheets (Taken 1/16/2025 1151 by Laila Polanco, RN)  Patient/family able to verbalize anxieties, fears, and concerns, and demonstrate effective coping: Reduce environmental stimuli, as able     Problem: Depression/Self Harm  Goal: Effect of psychiatric condition will be

## 2025-01-17 NOTE — TRANSITION OF CARE
START taking these medications      sertraline 50 MG tablet  Commonly known as: ZOLOFT  Take 1 tablet by mouth daily  Start taking on: January 18, 2025     traZODone 50 MG tablet  Commonly known as: DESYREL  Take 1 tablet by mouth nightly as needed for Sleep               Where to Get Your Medications        These medications were sent to [facility] - [place], [place] - [address] - P [phone] - F [phone]  [address] [place] [address]      Phone: [phone]   sertraline 50 MG tablet  traZODone 50 MG tablet         Unresulted Labs (24h ago, onward)      None            To obtain results of studies pending at discharge, please contact [phone]    Follow-up Information    None          Advanced Directive:   Does the patient have an appointed surrogate decision maker? No  Does the patient have a Medical Advance Directive? No  Does the patient have a Psychiatric Advance Directive? No  If the patient does not have a surrogate or Medical Advance Directive AND Psychiatric Advance Directive, the patient was offered information on these advance directives Patient declined to complete    Patient Instructions: Please continue all medications until otherwise directed by physician.      Tobacco Cessation Discharge Plan:   Is the patient a tobacco user  and needs referral for tobacco cessation? No  Patient referred to the following for tobacco cessation with an appointment? No  Patient was offered medication to assist with tobacco cessation at discharge? No    Alcohol/Substance Abuse Discharge Plan:   Does the patient have a history of substance/alcohol abuse and requires a referral for treatment? No  Patient referred to the following for substance/alcohol abuse treatment with an appointment? No  Patient was offered medication to assist with substance/alcohol abuse cessation at discharge? No      Patient discharged to: Home; Transition record discussed with patient/caregiver and provided  this record in hard copy or electronically

## 2025-01-17 NOTE — GROUP NOTE
Date: 1/17/2025    Group Start Time: 0925  Group End Time: 1033  Group Topic: Music Therapy    [facility] [place] [facility] [place]    Emotion Regulation through Music Listening  [place]errat Analysis/Discussion, Music Reminiscence, Song Sharing    Patients will listen to \"Melida Day\" by Flash Withanne and identify lyrics/themes/interpretations derived from the song. Patients will discuss what a melida day looks like to them. Patients will be asked to identify a song that will help them have a melida day (positive, uplifting, energetic, etc.). Patients will have the option to listen to the song of their choice.     Focus: Emotion Regulation, Future-Oriented Thinking, Identity Development    Goals: Improve Mood, Improve Insight/Self-Awareness, Increase Socialization/Community Building, Improve Self-Expression, Improve Attention to Task, Improve Coping Skills    Patient listened to peer song discussions. Patient selected to hear a song that improves his mood, and sang along to familiar music. Patient verbalized support for peers' contributions and bonded over shared memories / music preferences. Patient expressed that he listens to music as a way to self-soothe.     Attended: 1/2-3/4 attendance  Participation Level: Active Listener and Interactive  Participation Quality: Attentive, Sharing, and Supportive  Affect/Mood: Congruent/Euthymic  Speech: spontaneous, normal rate, and normal volume   Thought Content/Processes: Linear  Level of consciousness: Alert  Response: Able to verbalize current knowledge/experience  Outcomes: Successfully internalized the purpose of intervention, Actively participated in the group experience, and Anticipated discharge today    Signature: Odalys Purdy, Licensed Professional Music Therapist (LPMT)

## 2025-01-17 NOTE — GROUP NOTE
Group Therapy Note    Date: 1/16/2025    Group Start Time: 1400  Group End Time: 1445  Group Topic:  Group    [facility]        Group Therapy Note    Attendees: 8       Patient's Goal:  LGR Group with Jeannie Lee     Notes:  Client participated in LGR recovery group process. Group process revolved around recovery efforts, nicotine cessation and open discussion.      Status After Intervention:  Improved    Participation Level: Interactive    Participation Quality: Appropriate      Speech:  normal      Thought Process/Content: Logical      Affective Functioning: Congruent      Mood: angry      Level of consciousness:  Alert      Response to Learning: Able to verbalize current knowledge/experience      Endings: None Reported    Modes of Intervention: Education      Discipline Responsible: /Counselor      Signature:  ELLIOTT Sanchez

## 2025-01-17 NOTE — PROGRESS NOTES
Reviewed AVS with patient.  All belongings reviewed and returned to patient. Escorted to main lobby with his belongings and his AVS and medications.

## 2025-01-17 NOTE — GROUP NOTE
Group Therapy Note    Date: 1/16/2025    Group Start Time: 2050  Group End Time: 2130  Group Topic: Wrap-Up    [facility] Jessica Esquivel        Group Therapy Note    Attendees: 11/19       Notes:  Pt attended tonight's wrap up group.     Discipline Responsible: Behavorial Health Tech      Signature:  Jessica Salazar

## 2025-01-17 NOTE — DISCHARGE INSTR - DIET
Good nutrition is important when healing from an illness, injury, or surgery.  Follow any nutrition recommendations given to you during your hospital stay.   If you were given an oral nutrition supplement while in the hospital, continue to take this supplement at home.  You can take it with meals, in-between meals, and/or before bedtime. These supplements can be purchased at most local grocery stores, pharmacies, and chain [employer]-stores.   If you have any questions about your diet or nutrition, call the hospital and ask for the dietitian.  Resume diet as tolerated

## 2025-01-17 NOTE — DISCHARGE SUMMARY
DISCHARGE SUMMARY      Patient ID:  Ruben Watson  [id number]  19 y.o.  2005      Admit date: 2025    Discharge date and time: 2025    Admitting Physician: Popeye Vogt MD     Discharge Physician: Dr Sin MASSEY    Admission Diagnoses: Suicidal ideation [R45.851]  Depression, unspecified depression type [F32.A]  Depression with suicidal ideation [F32.A, R45.851]    Admission Condition: poor    Discharged Condition: stable    Admission Circumstance:     Patient presented to the ED at the request of [place] for suicidal ideation. He voluntarily came. Patient is guarded, flat affect with poor eye contact. He reports being depressed for a while now but suicidal thoughts are worsening. He wants to just \"end it all\" and has thoughts of using a gun to shoot self. A few days ago he had an unloaded gun in his hand but never pointed it at himself, he was thinking about loading it and shooting himself but did not load it. He has no family support around. Both his Dad and step-mom passed away when he was 15 years old. He found his Dad  on the floor. Has never sought treatment or therapy from this. He has a sister but she lives in [place]. Recently he has been overwhelmed and stressed out financially. He is not working and has no source of income. States he was let go from his job about a month and a half ago and currently seeking employment. Denies any HI. Denies any hallucinations.       HISTORY OF PRESENT ILLNESS:       The patient is a 19 y.o. male live with  and daughter 9 month old unemployed with no significant past history of mental illness     Pt has been feeling depressed for the past few weeks, getting worse  Stressors: financial stressor, getting along with the relationship  Pt is currently minimizing the circumstances of admission  Pt want someone to talk to and he lied about it to come over here  I don't have a gun or ammunition  Severity: Rating mood to be around 3-4/10 (10-

## 2025-01-17 NOTE — PROGRESS NOTES
CLINICAL PHARMACY NOTE: MEDS TO BEDS    Total # of Prescriptions Filled: 2   The following medications were delivered to the patient:  Sertraline 50 mg Tab  Trazodone 50 mg Tab    Additional Documentation: